# Patient Record
Sex: MALE | Race: WHITE | NOT HISPANIC OR LATINO
[De-identification: names, ages, dates, MRNs, and addresses within clinical notes are randomized per-mention and may not be internally consistent; named-entity substitution may affect disease eponyms.]

---

## 2021-02-22 ENCOUNTER — FORM ENCOUNTER (OUTPATIENT)
Age: 56
End: 2021-02-22

## 2021-02-23 ENCOUNTER — TRANSCRIPTION ENCOUNTER (OUTPATIENT)
Age: 56
End: 2021-02-23

## 2021-02-24 ENCOUNTER — APPOINTMENT (OUTPATIENT)
Dept: DISASTER EMERGENCY | Facility: CLINIC | Age: 56
End: 2021-02-24

## 2021-02-24 ENCOUNTER — OUTPATIENT (OUTPATIENT)
Dept: INPATIENT UNIT | Facility: HOSPITAL | Age: 56
LOS: 1 days | Discharge: HOME | End: 2021-02-24

## 2021-02-24 VITALS
RESPIRATION RATE: 20 BRPM | SYSTOLIC BLOOD PRESSURE: 125 MMHG | OXYGEN SATURATION: 99 % | HEART RATE: 57 BPM | TEMPERATURE: 98 F | DIASTOLIC BLOOD PRESSURE: 75 MMHG

## 2021-02-24 VITALS
OXYGEN SATURATION: 96 % | RESPIRATION RATE: 20 BRPM | SYSTOLIC BLOOD PRESSURE: 132 MMHG | DIASTOLIC BLOOD PRESSURE: 88 MMHG | TEMPERATURE: 98 F | HEART RATE: 69 BPM

## 2021-02-24 DIAGNOSIS — U07.1 COVID-19: ICD-10-CM

## 2021-02-24 PROBLEM — Z00.00 ENCOUNTER FOR PREVENTIVE HEALTH EXAMINATION: Status: ACTIVE | Noted: 2021-02-24

## 2021-02-24 RX ORDER — BAMLANIVIMAB 35 MG/ML
700 INJECTION, SOLUTION INTRAVENOUS ONCE
Refills: 0 | Status: COMPLETED | OUTPATIENT
Start: 2021-02-24 | End: 2021-02-24

## 2021-02-24 RX ORDER — SODIUM CHLORIDE 9 MG/ML
250 INJECTION INTRAMUSCULAR; INTRAVENOUS; SUBCUTANEOUS
Refills: 0 | Status: COMPLETED | OUTPATIENT
Start: 2021-02-24 | End: 2021-02-24

## 2021-02-24 RX ADMIN — SODIUM CHLORIDE 25 MILLILITER(S): 9 INJECTION INTRAMUSCULAR; INTRAVENOUS; SUBCUTANEOUS at 10:07

## 2021-02-24 RX ADMIN — BAMLANIVIMAB 270 MILLIGRAM(S): 35 INJECTION, SOLUTION INTRAVENOUS at 09:00

## 2021-02-24 NOTE — CHART NOTE - NSCHARTNOTEFT_GEN_A_CORE
Medicine Progress Note    Patient is a 55y old  Male who presents with a chief complaint of COVID-19 infection and for infusion of Bamlanivimab. He reports fever, malaise, myalgia, and cough started 1/20 and tested positive 2/21.     PAST MEDICAL & SURGICAL HISTORY:  Hypothyroidism  Overweight     Home Medications:  Levothyroxine     MEDICATIONS  (STANDING):  bamlanivimab (EUA) IVPB 700 milliGRAM(s) IV Intermittent once  sodium chloride 0.9%. 250 milliLiter(s) (25 mL/Hr) IV Continuous <Continuous>    PHYSICAL EXAM:  Vital Signs Last 24 Hrs  T(C): --  T(F): --  HR: --  BP: --  BP(mean): --  RR: --  SpO2: --  CONSTITUTIONAL: NAD, well-developed, well-groomed  ENMT: Moist oral mucosa, no pharyngeal injection or exudates; normal dentition  RESPIRATORY: Normal respiratory effort; lungs are clear to auscultation bilaterally  CARDIOVASCULAR: Regular rate and rhythm, normal S1 and S2, no murmur/rub/gallop; No lower extremity edema; Peripheral pulses are 2+ bilaterally  ABDOMEN: Nontender to palpation, normoactive bowel sounds, no rebound/guarding; No hepatosplenomegaly  PSYCH: A+O to person, place, and time; affect appropriate  NEUROLOGY: CN 2-12 are intact and symmetric; no gross sensory deficits   SKIN: No rashes; no palpable lesions    LABS:                        RADIOLOGY & ADDITIONAL TESTS:  Imaging from Last 24 Hours:    Electrocardiogram/QTc Interval:    COORDINATION OF CARE:  Care Discussed with Consultants/Other Providers: Medicine Progress Note    Patient is a 55y old  Male who presents with a chief complaint of COVID-19 infection and for infusion of Bamlanivimab. He reports fever, malaise, myalgia, and cough started 1/20 and tested positive 2/21. Has taken Tylenol with relief.     PAST MEDICAL & SURGICAL HISTORY:  Hypothyroidism  Overweight     Home Medications:  Levothyroxine     MEDICATIONS  (STANDING):  bamlanivimab (EUA) IVPB 700 milliGRAM(s) IV Intermittent once  sodium chloride 0.9%. 250 milliLiter(s) (25 mL/Hr) IV Continuous <Continuous>    PHYSICAL EXAM:  Vital Signs Last 24 Hrs  T(C): --  T(F): --  HR: --  BP: --  BP(mean): --  RR: --  SpO2: --  CONSTITUTIONAL: NAD, well-developed, well-groomed  RESPIRATORY: Normal respiratory effort; lungs are clear to auscultation bilaterally  CARDIOVASCULAR: Regular rate and rhythm, normal S1 and S2  PSYCH: A+O to person, place, and time; affect appropriate  SKIN: No rashes; no palpable lesions Medicine Progress Note    Patient is a 55y old  Male who presents with a chief complaint of COVID-19 infection and for infusion of Bamlanivimab. He reports fever, malaise, myalgia, and cough started 1/20 and tested positive 2/21. Has taken Tylenol with relief.     PAST MEDICAL & SURGICAL HISTORY:  Hypothyroidism  Overweight     Home Medications:  Levothyroxine     MEDICATIONS  (STANDING):  bamlanivimab (EUA) IVPB 700 milliGRAM(s) IV Intermittent once  sodium chloride 0.9%. 250 milliLiter(s) (25 mL/Hr) IV Continuous <Continuous>    PHYSICAL EXAM:  Vital Signs Last 24 Hrs  T(C): --  T(F): --  HR: --  BP: --  BP(mean): --  RR: --  SpO2: --  CONSTITUTIONAL: NAD, well-developed, well-groomed  RESPIRATORY: Normal respiratory effort; lungs are clear to auscultation bilaterally  CARDIOVASCULAR: Regular rate and rhythm, normal S1 and S2  PSYCH: A+O to person, place, and time; affect appropriate  SKIN: No rashes; no palpable lesions      Plan:  Obtained informed consent and discussed potential side effects with the patient. He verbalized understanding.   Monitor VSS as per protocol,   Insert peripheral IV.   Administer NSS 25 ml/hr continuously.   Infuse Bamlanivimab 700 mg over 1 hour.   Will monitor patient for 1 hour post infusion.     Disposition:  Infusion completed successfully without complications. He was observed for 1 hour post infusion without side effects.   Instructed patient to contact the call center or his PMD if he develops side effects at home. He was instructed to call 911 if he develops symptoms of a severe allergic reaction. He was also advised to maintain quarantine protocols. He verbalized understanding.   Proper use and technique of incentive spirometer was demonstrated to patient by RN and patient provided return demonstration of incentive spirometer.    VSS and IV removed successfully by RN. Patient was discharged home in [facility]. Medicine Progress Note    Patient is a 55y old  Male who presents with a chief complaint of COVID-19 infection and for infusion of Bamlanivimab. He reports fever, malaise, myalgia, and cough started 1/20 and tested positive 2/21. Has taken Tylenol with relief.     PAST MEDICAL & SURGICAL HISTORY:  Hypothyroidism  Overweight     Home Medications:  Levothyroxine     MEDICATIONS  (STANDING):  bamlanivimab (EUA) IVPB 700 milliGRAM(s) IV Intermittent once  sodium chloride 0.9%. 250 milliLiter(s) (25 mL/Hr) IV Continuous <Continuous>    PHYSICAL EXAM:  Vital Signs Last 24 Hrs  T(C): 36.6 (24 Feb 2021 09:30), Max: 36.6 (24 Feb 2021 09:30)  T(F): 97.9 (24 Feb 2021 09:30), Max: 97.9 (24 Feb 2021 09:30)  HR: 62 (24 Feb 2021 09:30) (62 - 69)  BP: 114/75 (24 Feb 2021 09:30) (114/75 - 132/88)  BP(mean): --  RR: 20 (24 Feb 2021 09:30) (20 - 20)  SpO2: 97% (24 Feb 2021 09:30) (96% - 97%)  CONSTITUTIONAL: NAD, well-developed, well-groomed  RESPIRATORY: Normal respiratory effort; lungs are clear to auscultation bilaterally  CARDIOVASCULAR: Regular rate and rhythm, normal S1 and S2  PSYCH: A+O to person, place, and time; affect appropriate  SKIN: No rashes; no palpable lesions      Plan:  Obtained informed consent and discussed potential side effects with the patient. He verbalized understanding.   Monitor VSS as per protocol,   Insert peripheral IV.   Administer NSS 25 ml/hr continuously.   Infuse Bamlanivimab 700 mg over 1 hour.   Will monitor patient for 1 hour post infusion.     Disposition:  Infusion completed successfully without complications. He was observed for 1 hour post infusion without side effects.   Instructed patient to contact the call center or his PMD if he develops side effects at home. He was instructed to call 911 if he develops symptoms of a severe allergic reaction. He was also advised to maintain quarantine protocols. He verbalized understanding.   Proper use and technique of incentive spirometer was demonstrated to patient by RN and patient provided return demonstration of incentive spirometer.    VSS and IV removed successfully by RN. Patient was discharged home in [facility].

## 2022-02-19 ENCOUNTER — TRANSCRIPTION ENCOUNTER (OUTPATIENT)
Age: 57
End: 2022-02-19